# Patient Record
Sex: FEMALE | Race: WHITE | NOT HISPANIC OR LATINO | Employment: OTHER | URBAN - METROPOLITAN AREA
[De-identification: names, ages, dates, MRNs, and addresses within clinical notes are randomized per-mention and may not be internally consistent; named-entity substitution may affect disease eponyms.]

---

## 2017-09-20 ENCOUNTER — HISTORICAL (OUTPATIENT)
Dept: LAB | Facility: HOSPITAL | Age: 42
End: 2017-09-20

## 2018-01-22 LAB
INFLUENZA A ANTIGEN, POC: NEGATIVE
INFLUENZA B ANTIGEN, POC: POSITIVE

## 2021-12-14 ENCOUNTER — HISTORICAL (OUTPATIENT)
Dept: ADMINISTRATIVE | Facility: HOSPITAL | Age: 46
End: 2021-12-14

## 2021-12-14 LAB
ABS NEUT (OLG): 5.2 X10(3)/MCL (ref 2.1–9.2)
ALBUMIN SERPL-MCNC: 4.9 GM/DL (ref 3.4–5)
ALBUMIN/GLOB SERPL: 2.33 {RATIO} (ref 1.5–2.5)
ALP SERPL-CCNC: 50 UNIT/L (ref 38–126)
ALT SERPL-CCNC: 17 UNIT/L (ref 7–52)
AST SERPL-CCNC: 23 UNIT/L (ref 15–37)
BILIRUB SERPL-MCNC: 0.8 MG/DL (ref 0.2–1)
BILIRUBIN DIRECT+TOT PNL SERPL-MCNC: 0.2 MG/DL (ref 0–0.5)
BILIRUBIN DIRECT+TOT PNL SERPL-MCNC: 0.6 MG/DL
BUN SERPL-MCNC: 18 MG/DL (ref 7–18)
CALCIUM SERPL-MCNC: 10 MG/DL (ref 8.5–10)
CHLORIDE SERPL-SCNC: 102 MMOL/L (ref 98–107)
CHOLEST SERPL-MCNC: 215 MG/DL (ref 0–200)
CHOLEST/HDLC SERPL: 3 {RATIO}
CO2 SERPL-SCNC: 28 MMOL/L (ref 21–32)
CREAT SERPL-MCNC: 0.74 MG/DL (ref 0.6–1.3)
ERYTHROCYTE [DISTWIDTH] IN BLOOD BY AUTOMATED COUNT: 13.1 % (ref 11.5–17)
GLOBULIN SER-MCNC: 2.1 GM/DL (ref 1.2–3)
GLUCOSE SERPL-MCNC: 96 MG/DL (ref 74–106)
HCT VFR BLD AUTO: 41.7 % (ref 37–47)
HDLC SERPL-MCNC: 71 MG/DL (ref 35–60)
HGB BLD-MCNC: 13.2 GM/DL (ref 12–16)
LDLC SERPL CALC-MCNC: 127 MG/DL (ref 0–129)
LYMPHOCYTES # BLD AUTO: 2.1 X10(3)/MCL (ref 0.6–3.4)
LYMPHOCYTES NFR BLD AUTO: 27.3 % (ref 13–40)
MCH RBC QN AUTO: 28.1 PG (ref 27–31.2)
MCHC RBC AUTO-ENTMCNC: 32 GM/DL (ref 32–36)
MCV RBC AUTO: 89 FL (ref 80–94)
MONOCYTES # BLD AUTO: 0.4 X10(3)/MCL (ref 0.1–1.3)
MONOCYTES NFR BLD AUTO: 5 % (ref 0.1–24)
NEUTROPHILS NFR BLD AUTO: 67.7 % (ref 47–80)
PLATELET # BLD AUTO: 268 X10(3)/MCL (ref 130–400)
PMV BLD AUTO: 9.6 FL (ref 9.4–12.4)
POTASSIUM SERPL-SCNC: 4.4 MMOL/L (ref 3.5–5.1)
PROT SERPL-MCNC: 7 GM/DL (ref 6.4–8.2)
RBC # BLD AUTO: 4.69 X10(6)/MCL (ref 4.2–5.4)
SODIUM SERPL-SCNC: 139 MMOL/L (ref 136–145)
TRIGL SERPL-MCNC: 64 MG/DL (ref 30–150)
VLDLC SERPL CALC-MCNC: 12.8 MG/DL
WBC # SPEC AUTO: 7.7 X10(3)/MCL (ref 4.5–11.5)

## 2022-04-11 ENCOUNTER — HISTORICAL (OUTPATIENT)
Dept: ADMINISTRATIVE | Facility: HOSPITAL | Age: 47
End: 2022-04-11

## 2022-04-29 VITALS
BODY MASS INDEX: 20.61 KG/M2 | HEIGHT: 65 IN | WEIGHT: 123.69 LBS | DIASTOLIC BLOOD PRESSURE: 72 MMHG | SYSTOLIC BLOOD PRESSURE: 124 MMHG | OXYGEN SATURATION: 99 %

## 2022-05-23 ENCOUNTER — OFFICE VISIT (OUTPATIENT)
Dept: URGENT CARE | Facility: CLINIC | Age: 47
End: 2022-05-23
Payer: COMMERCIAL

## 2022-05-23 VITALS
HEART RATE: 72 BPM | DIASTOLIC BLOOD PRESSURE: 89 MMHG | OXYGEN SATURATION: 100 % | TEMPERATURE: 98 F | RESPIRATION RATE: 16 BRPM | WEIGHT: 130 LBS | HEIGHT: 65 IN | SYSTOLIC BLOOD PRESSURE: 133 MMHG | BODY MASS INDEX: 21.66 KG/M2

## 2022-05-23 DIAGNOSIS — M79.605 LEFT LEG PAIN: Primary | ICD-10-CM

## 2022-05-23 PROCEDURE — 3075F SYST BP GE 130 - 139MM HG: CPT | Mod: CPTII,,, | Performed by: PHYSICIAN ASSISTANT

## 2022-05-23 PROCEDURE — 3008F PR BODY MASS INDEX (BMI) DOCUMENTED: ICD-10-PCS | Mod: CPTII,,, | Performed by: PHYSICIAN ASSISTANT

## 2022-05-23 PROCEDURE — 3079F PR MOST RECENT DIASTOLIC BLOOD PRESSURE 80-89 MM HG: ICD-10-PCS | Mod: CPTII,,, | Performed by: PHYSICIAN ASSISTANT

## 2022-05-23 PROCEDURE — 1160F PR REVIEW ALL MEDS BY PRESCRIBER/CLIN PHARMACIST DOCUMENTED: ICD-10-PCS | Mod: CPTII,,, | Performed by: PHYSICIAN ASSISTANT

## 2022-05-23 PROCEDURE — 1159F MED LIST DOCD IN RCRD: CPT | Mod: CPTII,,, | Performed by: PHYSICIAN ASSISTANT

## 2022-05-23 PROCEDURE — 3075F PR MOST RECENT SYSTOLIC BLOOD PRESS GE 130-139MM HG: ICD-10-PCS | Mod: CPTII,,, | Performed by: PHYSICIAN ASSISTANT

## 2022-05-23 PROCEDURE — 1159F PR MEDICATION LIST DOCUMENTED IN MEDICAL RECORD: ICD-10-PCS | Mod: CPTII,,, | Performed by: PHYSICIAN ASSISTANT

## 2022-05-23 PROCEDURE — 99213 OFFICE O/P EST LOW 20 MIN: CPT | Mod: ,,, | Performed by: PHYSICIAN ASSISTANT

## 2022-05-23 PROCEDURE — 1160F RVW MEDS BY RX/DR IN RCRD: CPT | Mod: CPTII,,, | Performed by: PHYSICIAN ASSISTANT

## 2022-05-23 PROCEDURE — 3079F DIAST BP 80-89 MM HG: CPT | Mod: CPTII,,, | Performed by: PHYSICIAN ASSISTANT

## 2022-05-23 PROCEDURE — 3008F BODY MASS INDEX DOCD: CPT | Mod: CPTII,,, | Performed by: PHYSICIAN ASSISTANT

## 2022-05-23 PROCEDURE — 99213 PR OFFICE/OUTPT VISIT, EST, LEVL III, 20-29 MIN: ICD-10-PCS | Mod: ,,, | Performed by: PHYSICIAN ASSISTANT

## 2022-05-23 RX ORDER — LISDEXAMFETAMINE DIMESYLATE 50 MG/1
1 CAPSULE ORAL DAILY
COMMUNITY
Start: 2022-02-17 | End: 2022-06-22 | Stop reason: SDUPTHER

## 2022-05-23 NOTE — PROGRESS NOTES
"Subjective:       Patient ID: Daphne Garza is a 47 y.o. female.    Vitals:  height is 5' 5" (1.651 m) and weight is 59 kg (130 lb). Her temperature is 98.4 °F (36.9 °C). Her blood pressure is 133/89 and her pulse is 72. Her respiration is 16 and oxygen saturation is 100%.     Chief Complaint: Leg Injury (Left leg injury about a week ago. Pain is worsening. Injury due to fall on stairs. )    Patient is a 47-year-old female who reports tripping on a step resulting in her left shin impacting the stairs.  She states that this injury occurred about a week ago but continues to have ecchymosis and discomfort locally.      Skin: Negative for erythema.       Objective:      Physical Exam   HENT:   Head: Normocephalic and atraumatic.   Nose: Nose normal.   Neck: Neck supple.   Abdominal: Normal appearance.   Musculoskeletal: Normal range of motion.         General: Tenderness and signs of injury present. Normal range of motion.   Neurological: She is alert.   Skin: No erythema and No lesion          Previous History      Review of patient's allergies indicates:  No Known Allergies    Past Medical History:   Diagnosis Date    ADHD (attention deficit hyperactivity disorder)      Current Outpatient Medications   Medication Instructions    VYVANSE 50 mg capsule 1 capsule, Oral, Daily     Past Surgical History:   Procedure Laterality Date     SECTION       History reviewed. No pertinent family history.    Social History     Tobacco Use    Smoking status: Never Smoker    Smokeless tobacco: Never Used        Physical Exam      Vital Signs Reviewed   /89   Pulse 72   Temp 98.4 °F (36.9 °C)   Resp 16   Ht 5' 5" (1.651 m)   Wt 59 kg (130 lb)   LMP 2022   SpO2 100%   BMI 21.63 kg/m²        Procedures    Procedures     Labs     Results for orders placed or performed in visit on 21   Comprehensive Metabolic Panel   Result Value Ref Range    Alanine Aminotransferase 17.0 7.0 - 52.0 unit/L    " Albumin/Globulin Ratio 2.33 1.50 - 2.50    Aspartate Aminotransferase 23.0 15.0 - 37.0 unit/L    Alkaline Phosphatase 50.0 38.0 - 126.0 unit/L    Albumin Level 4.9 3.4 - 5.0 gm/dL    Bilirubin Indirect 0.6 mg/dL    Blood Urea Nitrogen 18.0 7.0 - 18.0 mg/dL    Bilirubin Total 0.8 0.2 - 1.0 mg/dL    Bilirubin Direct 0.2 0.0 - 0.5 mg/dL    Chloride 102.0 98.0 - 107.0 mmol/L    Calcium Level Total 10.0 8.5 - 10.0 mg/dL    Carbon Dioxide 28.0 21.0 - 32.0 mmol/L    Creatinine 0.74 0.60 - 1.30 mg/dL    Glucose Level 96.0 74.0 - 106.0 mg/dL    Globulin 2.1 1.2 - 3.0 gm/dL    Sodium Level 139.0 136.0 - 145.0 mmol/L    Potassium Level 4.4 3.5 - 5.1 mmol/L    Protein Total 7.0 6.4 - 8.2 gm/dL   Lipid Panel   Result Value Ref Range    Cholesterol Total 215.0 (H) 0.0 - 200.0 mg/dL    Cholesterol/HDL Ratio 3.0     HDL Cholesterol 71.0 (H) 35.0 - 60.0 mg/dL    LDL Cholesterol 127.0 0.0 - 129.0 mg/dL    Triglyceride 64.0 30.0 - 150.0 mg/dL    Very Low Density Lipoprotein 12.8    GFR, Estimated   Result Value Ref Range    Estimated GFR-Non African American >60 mL/min/1.73 m2    Estimated GFR-African American >60 mL/min/1.73 m2   Differential   Result Value Ref Range    Mono # 0.4 0.1 - 1.3 x10(3)/mcL    Lymph # 2.1 0.6 - 3.4 x10(3)/mcL    Mono % 5.0 0.1 - 24.0 %    Neut % 67.7 47.0 - 80.0 %    Lymph % 27.3 13.0 - 40.0 %   CBC Auto Differential   Result Value Ref Range    Abs Neut 5.2 2.1 - 9.2 x10(3)/mcL    MCH 28.1 27.0 - 31.2 pg    Hgb 13.2 12.0 - 16.0 gm/dL    MCHC 32 32 - 36 gm/dL    MPV 9.6 9.4 - 12.4 fL    Hct 41.7 37.0 - 47.0 %    MCV 89 80 - 94 fL    RDW 13.1 11.5 - 17.0 %    RBC 4.69 4.20 - 5.40 x10(6)/mcL    WBC 7.7 4.5 - 11.5 x10(3)/mcL    Platelet 268 130 - 400 x10(3)/mcL         Assessment:       1. Left leg pain          Plan:         Left leg pain  -     XR TIBIA FIBULA 2 VIEW LEFT; Future; Expected date: 05/23/2022    Rest, Ice, Elevate    Get plenty of rest.     Go to emergency department with any significant change  or worsening symptoms.                      Xrays- no observed acute fx. Awaiting radiology over read.

## 2022-05-23 NOTE — PATIENT INSTRUCTIONS
Rest, Ice, Elevate    Get plenty of rest.     Go to emergency department with any significant change or worsening symptoms.

## 2022-05-26 ENCOUNTER — TELEPHONE (OUTPATIENT)
Dept: URGENT CARE | Facility: CLINIC | Age: 47
End: 2022-05-26
Payer: COMMERCIAL

## 2022-06-23 PROBLEM — F98.8 ATTENTION DEFICIT DISORDER: Status: ACTIVE | Noted: 2022-06-23

## 2022-09-22 ENCOUNTER — HISTORICAL (OUTPATIENT)
Dept: ADMINISTRATIVE | Facility: HOSPITAL | Age: 47
End: 2022-09-22
Payer: COMMERCIAL